# Patient Record
Sex: FEMALE | Race: WHITE
[De-identification: names, ages, dates, MRNs, and addresses within clinical notes are randomized per-mention and may not be internally consistent; named-entity substitution may affect disease eponyms.]

---

## 2017-12-06 ENCOUNTER — HOSPITAL ENCOUNTER (EMERGENCY)
Dept: HOSPITAL 10 - FTE | Age: 27
LOS: 1 days | Discharge: HOME | End: 2017-12-07
Payer: COMMERCIAL

## 2017-12-06 VITALS
HEIGHT: 64 IN | WEIGHT: 208.34 LBS | BODY MASS INDEX: 35.57 KG/M2 | BODY MASS INDEX: 35.57 KG/M2 | HEIGHT: 64 IN | WEIGHT: 208.34 LBS

## 2017-12-06 DIAGNOSIS — J20.9: ICD-10-CM

## 2017-12-06 DIAGNOSIS — Z3A.20: ICD-10-CM

## 2017-12-06 DIAGNOSIS — R06.2: ICD-10-CM

## 2017-12-06 DIAGNOSIS — O99.512: Primary | ICD-10-CM

## 2017-12-06 PROCEDURE — 94664 DEMO&/EVAL PT USE INHALER: CPT

## 2017-12-07 VITALS
RESPIRATION RATE: 17 BRPM | HEART RATE: 95 BPM | DIASTOLIC BLOOD PRESSURE: 71 MMHG | SYSTOLIC BLOOD PRESSURE: 114 MMHG | TEMPERATURE: 98.4 F

## 2017-12-07 NOTE — ERD
ER Documentation


Chief Complaint


Chief Complaint


pt c/o diff breathing x 2 wks, w/ cough, 20 wks preg





HPI


Patient is a 27-year-old pregnant female, approximately 20 weeks pregnant, 

, presents to the ED for concerns of shortness of breath and cough 2 weeks.  

Patient states her cough is dry in nature.  Patient describes her shortness of 

breath to be occurring 2-3 times per day.  Patient states her shortness of 

breath lasts approximately 5-10 minutes.  Patient states that her shortness of 

breath can occur with rest or at movement.  Patient denies any pleuritic chest 

pain.  Patient denies any leg swelling or calf swelling.  Patient denies any 

previous history of a DVT or PE.  Patient denies any recent air travel or 

surgeries.  Patient denies any fevers, nausea, vomiting, abdominal pain, pelvic 

pain, vaginal bleeding, cramping, excessive vaginal discharge or loss of 

fluids.  Patient states his tried taking throat lozenges for symptoms.  Patient 

states approximately 1 month ago she did have a URI with throat pain as well.  

Patient sees Dr. Oquendo is her OB/GYN.





ROS


All systems reviewed and are negative except as per history of present illness.





Medications


Home Meds


Active Scripts


Guaifenesin* (Robitussin*) 100 Mg/5 Ml Syrup, 100 MG PO Q4H Y for COUGH, #1 BOT


   Prov:MIRELLA MICHELLE PA-C         17


Azithromycin* (Zithromax*) 250 Mg Tablet, 250 MG PO .ZPACK AS DIRECTED, #6 TAB


   TAKE 500 MG (2 TABS) THE FIRST DAY THEN 250 MG (1 TAB) DAYS 2-5


   Prov:MIRELLA MICHELLE PA-C         17


Reported Medications


Prenatal Vit-Iron Fumarate-FA (Prenatal Vitamin Tablet) 1 Each Tablet, 1 TAB PO 

DAILY, TAB


   10/17/16





Allergies


Allergies:  


Coded Allergies:  


     No Known Allergy (Unverified , 10/17/16)





PMhx/Soc


Hx Alcohol Use:  No


Hx Substance Use:  No


Hx Tobacco Use:  No





Physical Exam


Vitals





Vital Signs








  Date Time  Temp Pulse Resp B/P Pulse Ox O2 Delivery O2 Flow Rate FiO2


 


17 01:43 98.4 95 17 114/71 98 Room Air  


 


17 00:58  107 18  96   21


 


17 23:31 97.8 108 20 135/70 96   








Physical Exam


GENERAL: Well-developed, well-nourished female. Appears in no acute distress. 

Speaking in full sentences.


HEAD: Normocephalic, atraumatic. No deformities or ecchymosis. 


EYE: Pupils equal, round, and reactive to light. EOMs intact. No conjunctival 

erythema. No eye discharge. 


ENT: External ear without any masses or tenderness. Auditory canals clear 

bilaterally. TM visualized bilaterally, non-erythematous, non-bulging. Nasal 

mucosa pink with no discharge. Oropharynx is pink without any tonsillar 

erythema or exudates. No uvula deviation. No kissing tonsils. 


NECK: Supple. No meningismus. Normal ROM of the neck.


LUNG: Faint wheezing noted in the right upper lobe.


HEART: Mildly tachycardic. No murmurs, rubs or gallops.


ABDOMEN: Soft, nontender, and nondistended. Positive bowel sounds in all four 

quadrants. No rebound tenderness, no guarding. (-) McBurney's point tenderness.

  No CVA tenderness.


BACK: No midline tenderness. 


EXTREMITIES: Equal pulses bilaterally. No peripheral clubbing, cyanosis or 

edema. No unilateral leg swelling.  No pain elicited in bilateral calves with 

dorsiflexion.  No calf tenderness with palpation bilaterally..


NEUROLOGIC: Alert and oriented to person, place and time. Moving all four 

extremities. 5/5 strength in all extremities. Normal speech. Steady gait. 


SKIN: Normal color. Warm and dry. No rashes or lesions.


Results 24 hrs





 Current Medications








 Medications


  (Trade)  Dose


 Ordered  Sig/Anna


 Route


 PRN Reason  Start Time


 Stop Time Status Last Admin


Dose Admin


 


 Ipratropium


 Bromide


  (Atrovent 0.02%


  (Neb))  0.5 mg  ONCE  STAT


 NEB


   17 00:42


 17 00:44 DC 17 00:58


 


 


 Levalbuterol


  (Xopenex Neb)  1.25 mg  ONCE  STAT


 INH


   17 00:42


 17 00:44 DC 17 00:58


 











Procedures/MDM


ED COURSE:


The patient was stable throughout ED course. I kept the patient and/or family 

informed of laboratory and diagnostic imaging results throughout the ED course.

  








MEDICATIONS GIVEN: 


Xopenex, ipratropium





Prior to patient receiving these medications I did explain to the patient that 

Xopenex is a category C medication.  The risks versus benefits of this 

medication were explained to the patient in detail.  Patient understood risks 

versus benefits.  Patient agreed to take medication.  Ipratropium was noted to 

be safe in pregnancy.


Patient tolerated medication well with no adverse reactions. Patient reported 

improvement in pain. 








MEDICAL DECISION MAKING:


This is a 27-year-old pregnant female presents ED for concerns of shortness of 

breath and cough 2 weeks.  Patient denied any pleuritic chest pain, leg 

swelling, tenderness, recent travel or recent surgeries.  Vital signs were 

reviewed. Patient was afebrile. Patient was not hypoxic. ENT exam was normal.  

Lung exam did reveal some wheezing.  I discussed the case with my supervising 

physician, Dr. Gutierrez.  Given the patient's presentation and including her 

findings of wheezing on examination, it was decided that patient would likely 

benefit from a breathing treatment and a course of antibiotics on an outpatient 

basis..  Patient was advised on the risks versus benefits of Xopenex in the 

setting of pregnancy.  Patient continued to wish to receive this medication.  

Upon reexamination, patient had improved breath sounds and felt that her 

shortness of breath was improved.  Patient's O2 sat was improved.  Patient's 

tachycardia was improved.  At this time, patient's presentation is most 

consistent with bronchitis with wheezing.  Given that patient is pregnant 

unable to rule out pneumonia however I will empirically treat the patient with 

a course of Z-[place].  The suspicion for meningitis, sinusitis, otitis externa, 

acute otitis media, strep pharyngitis, epiglottitis, DVT or PE.  Patient was 

given strict return precautions to return to the ED for any new or worsening 

symptoms.  Patient understood and agreed with this plan.  Patient was cleared 

by OB team here in the ED.  OB nurse came down to the ED and confirmed fetal 

heart tones.











PRESCRIPTIONS:


Zithromax and Robitussin





DISCHARGE:


At this time, patient is stable for discharge and outpatient management. 

Supportive therapies such as OTC throat lozenges, salt water gurgles, popsicles 

and jello discussed. I have instructed the patient to follow-up with his/her 

primary care physician in 1-2 days. I have instructed the patient to promptly 

return to the ER for any new or worsening symptoms including increased pain, 

swelling, fever, nausea, vomiting, weakness or difficulty breathing. The 

patient and/or family expressed understanding of and agreement with this plan. 

All questions were answered. Home care instructions were provided. 





Disclaimer: Inadvertent spelling and grammatical errors are likely due to EHR/

dictation software use and do not reflect on the overall quality of patient 

care. Also, please note that the electronic time recorded on this note does not 

necessarily reflect the actual time of the patient encounter.





Departure


Diagnosis:  


 Primary Impression:  


 Bronchitis


 Additional Impression:  


 Wheezing


Condition:  Stable


Patient Instructions:  Bronchitis With Wheezing (Adult)





Additional Instructions:  


Call your primary care doctor TOMORROW for an appointment during the next 1-2 

days.See the doctor sooner or return here if your condition worsens before your 

appointment time.











MIRELLA MICHELLE PA-C Dec 7, 2017 02:03

## 2017-12-19 ENCOUNTER — HOSPITAL ENCOUNTER (EMERGENCY)
Dept: HOSPITAL 10 - FTE | Age: 27
Discharge: HOME | End: 2017-12-19
Payer: COMMERCIAL

## 2017-12-19 VITALS
BODY MASS INDEX: 33.98 KG/M2 | BODY MASS INDEX: 33.98 KG/M2 | HEIGHT: 65 IN | HEIGHT: 65 IN | WEIGHT: 203.93 LBS | WEIGHT: 203.93 LBS

## 2017-12-19 DIAGNOSIS — R06.2: Primary | ICD-10-CM

## 2017-12-19 PROCEDURE — 94664 DEMO&/EVAL PT USE INHALER: CPT

## 2017-12-19 PROCEDURE — 96372 THER/PROPH/DIAG INJ SC/IM: CPT

## 2017-12-19 NOTE — ERD
ER Documentation


Chief Complaint


Chief Complaint


cough , chest congestion , sob





HPI


This is a 27-year-old female presents to the ER with an ongoing cough over the 

last month.  She states that she wheezes at night and she feels short of 

breath.  Shortness of breath occurs at rest and whenever she is walking.  She 

denies any chest pain.  Patient denies any recent travel, leg redness or leg 

swelling.  She has not had a past medical history of asthma.  She is 

approximately 22 weeks pregnant denies any vaginal bleeding, vaginal leakage, 

pelvic pain, urinary frequency or dysuria.  Patient was seen here 3 weeks ago 

and was given azithromycin.  She was also given a losing treatment and she 

stated that it did not help her.  Patient any fevers or chills.  She has not 

traveled anywhere recently.  She has never had a PE or history of PEs or DVTs.  

 A0.  She denies any drinking or smoking.





ROS


12 point review of systems was done, all negative except per HPI.





Medications


Home Meds


Active Scripts


Amoxicillin/Potassium Clav (Amox-Clav 500-125 mg Tablet) 500-125 mg Tab, 1 TAB 

PO BID for 7 Days, TAB


   Prov:MANNY ENRIQUEZ         17


Albuterol Sulfate* (Proair HFA*) 8.5 Gm Hfa.aer.ad, 2 PUFF INH Q4, #1 INHALER


   Prov:MANNY ENRIQUEZ         17


Guaifenesin* (Robitussin*) 100 Mg/5 Ml Syrup, 100 MG PO Q4H Y for COUGH, #1 BOT


   Prov:MIRELLA MICHELLE PA-C         17


Azithromycin* (Zithromax*) 250 Mg Tablet, 250 MG PO .ZPACK AS DIRECTED, #6 TAB


   TAKE 500 MG (2 TABS) THE FIRST DAY THEN 250 MG (1 TAB) DAYS 2-5


   Prov:MIRELLA MICHELLE PA-C         17


Reported Medications


Prenatal Vit-Iron Fumarate-FA (Prenatal Vitamin Tablet) 1 Each Tablet, 1 TAB PO 

DAILY, TAB


   10/17/16





Allergies


Allergies:  


Coded Allergies:  


     No Known Allergy (Unverified , 17)





PMhx/Soc


Medical and Surgical Hx:  pt denies Medical Hx, pt denies Surgical Hx


Hx Alcohol Use:  No


Hx Substance Use:  No


Hx Tobacco Use:  No


Smoking Status:  Never smoker





Physical Exam


Vitals





Vital Signs








  Date Time  Temp Pulse Resp B/P Pulse Ox O2 Delivery O2 Flow Rate FiO2


 


17 12:37  80 18  96   21


 


17 11:19 98.1 90 20 136/76 95   








Physical Exam


GENERAL: The patient is well-developed, well-nourished, in no acute distress.


NECK: Cervical spine is non tender with no step off. Supple, no nuchal rigidity


HEENT: Atraumatic. Pupils equal, round and reactive to light. Extraocular 

muscles are grossly intact. Conjunctivae pink, no discharge. Bilateral tympanic 

membranes are clear with no evidence of erythema, effusion or dulling of the 

light reflex. Tonsilar erythema with no exudates or uvular deviation. Clear 

rhinorrhea. 


RESPIRATORY: Auditory wheezes in the lung fields, no rales rhonchi or crackles.


HEART: Regular rate and rhythm. No murmurs, clicks, rubs or gallops.


EXTREMITIES: No clubbing or cyanosis. Full range of motion. Grossly 

neurovascularly intact. no leg redness or swelling


NEUROLOGIC: Alert and oriented. Cranial nerves II through XII are intact.


SKIN: There is no rash. The skin is warm and dry.


Results 24 hrs





 Current Medications








 Medications


  (Trade)  Dose


 Ordered  Sig/Anna


 Route


 PRN Reason  Start Time


 Stop Time Status Last Admin


Dose Admin


 


 Albuterol


  (Proventil


 0.083% (Neb))  10 mg  ONCE  STAT


 N


   17 12:05


 17 12:11 DC 17 12:37


 


 


 Ipratropium


 Bromide


  (Atrovent 0.02%


  (Neb))  0.5 mg  ONCE  ONCE


 N


   17 12:30


 17 12:31 DC 17 12:36


 


 


 Dexamethasone


  (Decadron)  10 mg  ONCE  ONCE


 IM


   17 12:30


 17 12:31 DC 17 12:29


 











Procedures/MDM


Patient was given a nebulizing treatment in the ER with albuterol and 

ipratropium.  She was also given Decadron in the ER without any complications.  

Upon reexamination patient stated she felt much better, and wheezing was 

improved.  I did discuss that albuterol and Decadron a category C medication 

that they do involve the risk, patient understood the risks and the benefits of 

this medication decided to try medication.





I discussed his case with my supervising physician Dr. Alvarenga, as this is patient

's 2nd time back in the ER for same complaints. I discussed the possibility of 

patient having a pulmonary embolism with Dr. Alvarenga, however suspicion is low as 

patient does present with URI symptoms which are improved with  nebulizing 

treatments. Patient was already treated with azithromycin, she will be sent 

home with Augmentin and she will be given an inhaler for wheezing. Patient did 

not have any leg swelling or redness on physical exam I doubt DVT.  Patient 

urgently needs to follow-up with her OB/GYN as soon as possible, return to ER 

sooner if symptoms worsen.  Medical decision making sure with the patient she 

understands and agrees with plan.





Departure


Diagnosis:  


 Primary Impression:  


 Wheezing


Condition:  Stable


Patient Instructions:  Bronchitis With Wheezing (Adult)





Additional Instructions:  


Call your primary care doctor TOMORROW for an appointment during the next 1-2 

days.See the doctor sooner or return here if your condition worsens before your 

appointment time.











MANNY ENRIQUEZ Dec 19, 2017 14:29

## 2018-04-16 ENCOUNTER — HOSPITAL ENCOUNTER (INPATIENT)
Age: 28
LOS: 2 days | Discharge: HOME | End: 2018-04-18

## 2018-04-16 ENCOUNTER — HOSPITAL ENCOUNTER (INPATIENT)
Dept: HOSPITAL 91 - OBT | Age: 28
LOS: 2 days | Discharge: HOME | End: 2018-04-18
Payer: COMMERCIAL

## 2018-04-16 DIAGNOSIS — Z3A.39: ICD-10-CM

## 2018-04-16 LAB
ADD MAN DIFF?: NO
BASOPHIL #: 0 10^3/UL (ref 0–0.1)
BASOPHILS %: 0.2 % (ref 0–2)
EOSINOPHILS #: 0.1 10^3/UL (ref 0–0.5)
EOSINOPHILS %: 1.5 % (ref 0–7)
HEMATOCRIT: 36.6 % (ref 37–47)
HEMOGLOBIN: 12.8 G/DL (ref 12–16)
HEPATITIS B SURFACE ANTIGEN: NEGATIVE
INR: 0.87
LYMPHOCYTES #: 2.3 10^3/UL (ref 0.8–2.9)
LYMPHOCYTES %: 27.8 % (ref 15–51)
MEAN CORPUSCULAR HEMOGLOBIN: 30.4 PG (ref 29–33)
MEAN CORPUSCULAR HGB CONC: 35 G/DL (ref 32–37)
MEAN CORPUSCULAR VOLUME: 86.9 FL (ref 82–101)
MEAN PLATELET VOLUME: 10.9 FL (ref 7.4–10.4)
MONOCYTE #: 0.5 10^3/UL (ref 0.3–0.9)
MONOCYTES %: 6 % (ref 0–11)
NEUTROPHIL #: 5.2 10^3/UL (ref 1.6–7.5)
NEUTROPHILS %: 64.1 % (ref 39–77)
NUCLEATED RED BLOOD CELLS #: 0 10^3/UL (ref 0–0)
NUCLEATED RED BLOOD CELLS%: 0 /100WBC (ref 0–0)
PARTIAL THROMBOPLASTIN TIME: 29.9 SEC (ref 25–35)
PLATELET COUNT: 167 10^3/UL (ref 140–415)
PROTIME: 11.9 SEC (ref 11.9–14.9)
PT RATIO: 0.9
RAPID PLASMA REAGIN: NONREACTIVE
RED BLOOD COUNT: 4.21 10^6/UL (ref 4.2–5.4)
RED CELL DISTRIBUTION WIDTH: 13.4 % (ref 11.5–14.5)
RUPTURE FETAL MEMBRANES: POSITIVE
WHITE BLOOD COUNT: 8.1 10^3/UL (ref 4.8–10.8)

## 2018-04-16 PROCEDURE — 76818 FETAL BIOPHYS PROFILE W/NST: CPT

## 2018-04-16 PROCEDURE — 85730 THROMBOPLASTIN TIME PARTIAL: CPT

## 2018-04-16 PROCEDURE — 3E033VJ INTRODUCTION OF OTHER HORMONE INTO PERIPHERAL VEIN, PERCUTANEOUS APPROACH: ICD-10-PCS

## 2018-04-16 PROCEDURE — 85610 PROTHROMBIN TIME: CPT

## 2018-04-16 PROCEDURE — 84112 EVAL AMNIOTIC FLUID PROTEIN: CPT

## 2018-04-16 PROCEDURE — 85025 COMPLETE CBC W/AUTO DIFF WBC: CPT

## 2018-04-16 PROCEDURE — 86900 BLOOD TYPING SEROLOGIC ABO: CPT

## 2018-04-16 PROCEDURE — 87340 HEPATITIS B SURFACE AG IA: CPT

## 2018-04-16 PROCEDURE — 0UQMXZZ REPAIR VULVA, EXTERNAL APPROACH: ICD-10-PCS

## 2018-04-16 PROCEDURE — 76815 OB US LIMITED FETUS(S): CPT

## 2018-04-16 PROCEDURE — 86592 SYPHILIS TEST NON-TREP QUAL: CPT

## 2018-04-16 PROCEDURE — 62319: CPT

## 2018-04-16 PROCEDURE — 86901 BLOOD TYPING SEROLOGIC RH(D): CPT

## 2018-04-16 RX ADMIN — IBUPROFEN 1 MG: 600 TABLET ORAL at 18:19

## 2018-04-16 RX ADMIN — PYRIDOXINE HYDROCHLORIDE 1 MLS/HR: 100 INJECTION, SOLUTION INTRAMUSCULAR; INTRAVENOUS at 07:47

## 2018-04-16 RX ADMIN — PYRIDOXINE HYDROCHLORIDE 1 MLS/HR: 100 INJECTION, SOLUTION INTRAMUSCULAR; INTRAVENOUS at 09:51

## 2018-04-16 RX ADMIN — OXYTOCIN 1 MLS/HR: 10 INJECTION, SOLUTION INTRAMUSCULAR; INTRAVENOUS at 16:33

## 2018-04-16 RX ADMIN — Medication 1 MLS/HR: at 10:05

## 2018-04-16 RX ADMIN — Medication 1 MLS/HR: at 20:00

## 2018-04-16 RX ADMIN — Medication 1 MLS/HR: at 16:35

## 2018-04-16 RX ADMIN — Medication 1 SPRAY: at 18:19

## 2018-04-16 RX ADMIN — IBUPROFEN 1 MG: 600 TABLET ORAL at 23:21

## 2018-04-16 RX ADMIN — Medication 1 APPLIC: at 18:20

## 2018-04-16 RX ADMIN — Medication 1 MLS/HR: at 14:24

## 2018-04-16 RX ADMIN — DOCUSATE SODIUM AND SENNOSIDES 1 TAB: 8.6; 5 TABLET, FILM COATED ORAL at 21:52

## 2018-04-16 RX ADMIN — WITCH HAZEL 1 PAD: 500 SOLUTION RECTAL; TOPICAL at 18:19

## 2018-04-17 LAB
ADD MAN DIFF?: NO
BASOPHIL #: 0 10^3/UL (ref 0–0.1)
BASOPHILS %: 0.1 % (ref 0–2)
EOSINOPHILS #: 0.2 10^3/UL (ref 0–0.5)
EOSINOPHILS %: 1.5 % (ref 0–7)
HEMATOCRIT: 26.2 % (ref 37–47)
HEMOGLOBIN: 9.1 G/DL (ref 12–16)
LYMPHOCYTES #: 2.7 10^3/UL (ref 0.8–2.9)
LYMPHOCYTES %: 27.8 % (ref 15–51)
MEAN CORPUSCULAR HEMOGLOBIN: 30.3 PG (ref 29–33)
MEAN CORPUSCULAR HGB CONC: 34.7 G/DL (ref 32–37)
MEAN CORPUSCULAR VOLUME: 87.3 FL (ref 82–101)
MEAN PLATELET VOLUME: 10.6 FL (ref 7.4–10.4)
MONOCYTE #: 0.4 10^3/UL (ref 0.3–0.9)
MONOCYTES %: 4.5 % (ref 0–11)
NEUTROPHIL #: 6.4 10^3/UL (ref 1.6–7.5)
NEUTROPHILS %: 65.3 % (ref 39–77)
NUCLEATED RED BLOOD CELLS #: 0 10^3/UL (ref 0–0)
NUCLEATED RED BLOOD CELLS%: 0 /100WBC (ref 0–0)
PLATELET COUNT: 135 10^3/UL (ref 140–415)
RED BLOOD COUNT: 3 10^6/UL (ref 4.2–5.4)
RED CELL DISTRIBUTION WIDTH: 13.8 % (ref 11.5–14.5)
WHITE BLOOD COUNT: 9.8 10^3/UL (ref 4.8–10.8)

## 2018-04-17 RX ADMIN — Medication 1 MLS/HR: at 00:00

## 2018-04-17 RX ADMIN — DOCUSATE SODIUM AND SENNOSIDES 1 TAB: 8.6; 5 TABLET, FILM COATED ORAL at 21:39

## 2018-04-17 RX ADMIN — IBUPROFEN 1 MG: 600 TABLET ORAL at 17:45

## 2018-04-17 RX ADMIN — IBUPROFEN 1 MG: 600 TABLET ORAL at 05:14

## 2018-04-17 RX ADMIN — Medication 1 MLS/HR: at 08:00

## 2018-04-17 RX ADMIN — IBUPROFEN 1 MG: 600 TABLET ORAL at 11:46

## 2018-04-17 RX ADMIN — Medication 1 MLS/HR: at 12:00

## 2018-04-17 RX ADMIN — Medication 1 MLS/HR: at 20:00

## 2018-04-17 RX ADMIN — OXYCODONE HYDROCHLORIDE AND ASPIRIN 1 TAB: 4.8355; 325 TABLET ORAL at 15:52

## 2018-04-17 RX ADMIN — Medication 1 MLS/HR: at 16:00

## 2018-04-17 RX ADMIN — IBUPROFEN 1 MG: 600 TABLET ORAL at 23:59

## 2018-04-17 RX ADMIN — DOCUSATE SODIUM AND SENNOSIDES 1 TAB: 8.6; 5 TABLET, FILM COATED ORAL at 11:46

## 2018-04-17 RX ADMIN — Medication 1 MLS/HR: at 04:00

## 2018-04-18 RX ADMIN — Medication 1 MLS/HR: at 00:00

## 2018-04-18 RX ADMIN — Medication 1 MLS/HR: at 04:00

## 2018-04-18 RX ADMIN — IBUPROFEN 1 MG: 600 TABLET ORAL at 05:24

## 2018-04-18 RX ADMIN — DOCUSATE SODIUM AND SENNOSIDES 1 TAB: 8.6; 5 TABLET, FILM COATED ORAL at 08:42

## 2019-08-10 ENCOUNTER — HOSPITAL ENCOUNTER (EMERGENCY)
Dept: HOSPITAL 10 - FTE | Age: 29
Discharge: HOME | End: 2019-08-10
Payer: COMMERCIAL

## 2019-08-10 ENCOUNTER — HOSPITAL ENCOUNTER (EMERGENCY)
Dept: HOSPITAL 91 - FTE | Age: 29
Discharge: HOME | End: 2019-08-10
Payer: COMMERCIAL

## 2019-08-10 VITALS
HEIGHT: 64 IN | WEIGHT: 198.42 LBS | HEIGHT: 64 IN | WEIGHT: 198.42 LBS | BODY MASS INDEX: 33.87 KG/M2 | BODY MASS INDEX: 33.87 KG/M2

## 2019-08-10 DIAGNOSIS — N64.4: Primary | ICD-10-CM

## 2019-08-10 PROCEDURE — 99284 EMERGENCY DEPT VISIT MOD MDM: CPT

## 2019-08-10 PROCEDURE — 76642 ULTRASOUND BREAST LIMITED: CPT

## 2019-08-10 NOTE — ERD
ER Documentation


Chief Complaint


Chief Complaint





wants right breast us, has pain/mass?





HPI


29-year-old female presenting with pain to the right breast.  Patient feels that


there is a mass on the 3:00 region of her right breast and denies any nipple 


discharge or blood.  She denies any swelling or warmth to the affected breast 


and is not breast-feeding.  Patient has not breast-fed for the last year.  


Denies fevers.  Denies other medical pulse.  NKDA.  Surgical history denies.  


Social history denies





ROS


All systems reviewed and are negative except as per history of present illness.





Medications


Home Meds


Active Scripts


Naproxen* (Naprosyn*) 500 Mg Tablet, 500 MG PO BID PRN for PAIN AND/OR 


INFLAMMATION, #30 TAB


   Prov:ROLANDO MIRANDA PA-C         8/10/19


Albuterol Sulfate* (Proair HFA*) 8.5 Gm Hfa.aer.ad, 2 PUFF INH Q4, #1 INHALER


   Prov:MANNY ENRIQUEZ         17


Reported Medications


Prenatal Vit-Iron Fumarate-FA (Prenatal Vitamin Tablet) 1 Each Tablet, 1 TAB PO 


DAILY, TAB


   10/17/16





Allergies


Allergies:  


Coded Allergies:  


     No Known Allergy (Unverified , 17)





PMhx/Soc


Medical and Surgical Hx:  pt denies Medical Hx, pt denies Surgical Hx


Hx Alcohol Use:  No


Hx Substance Use:  No


Hx Tobacco Use:  No


Smoking Status:  Never smoker





FmHx


Family History:  No diabetes, No coronary disease, No other





Physical Exam


Vitals





Vital Signs


  Date      Temp  Pulse  Resp  B/P (MAP)   Pulse Ox  O2          O2 Flow    FiO2


Time                                                 Delivery    Rate


   8/10/19  98.1     78    18      121/71        99


     06:55                           (88)





Physical Exam


GENERAL: The patient is well-appearing, well-nourished, in no acute distress


HEENT: Atraumatic.  Conjunctivae are pink.  Pupils equal, round, and reactive to


light.  There is no scleral icterus.  Tympanic membranes clear bilaterally.  


Oropharynx clear.


CHEST: Clear to auscultation bilaterally.  There are no rales, wheezes or 


rhonchi.


HEART: Regular rate and rhythm.  No murmurs, clicks, rubs or gallops.  


ABDOMEN:Soft, nontender and nondistended.  Good bowel sounds.  No rebound or 


guarding.  No gross peritonitis.  No gross organomegaly or masses.  


BREAST: No erythema or swelling noted to the right breast.  No masses felt.  No 


retraction of the nipple.  No blood or drainage noted from the nipple.





Procedures/MDM





                            DIAGNOSTIC IMAGING REPORT





Patient: JOYCE ROSAOD   : 1990   Age: 29  Sex: F                     


  


       MR #:    [id number]   Acct #:   [id number]    DOS: 08/10/19 0711


Ordering MD: NISHANT MIRANDA PA-C   Location:  [facility]   Room/Bed:            


                               


                                        


PROCEDURE:    Ultrasound chest wall


 


CLINICAL INDICATION:  Pain, evaluate for abscess


 


TECHNIQUE:   Multiple sonographic images of the kidneys were obtained.  The 


images were reviewed on a PACS workstation. 


 


COMPARISON:   None 


 


FINDINGS:


 


No lesions are identified within the area of concern at the 3 o'clock position 


in the right breast. A 1.4 x 0.6 x 1.8 cm right axillary lymph node is 


identified.


 


IMPRESSION:


 


1.  No evidence for abscess.


2.  No lesions identified in the region of concern. Followup imaging and 


mammography at a dedicated breast center is recommended.


3.  Mildly prominent right axillary lymph node, nonspecific and possibly 


reactive.





MDM:  29-year-old female presenting with right breast pain.  I have low 


suspicion for mass.  I do not feel any masses on my exam and ultrasound is 


within normal limits.  I do recommend follow-up for mammogram and evaluation by 


breast specialist.  Patient's breast pain is likely associated with hormonal 


changes.  I have low suspicion for infectious process.  Patient is discharged 


with strict your precautions and supportive medications.  Patient is told 


symptoms change or worsen to return immediately to the ER.  All questions 


answered at discharge





Departure


Diagnosis:  


   Primary Impression:  


   Breast pain


Condition:  Stable


Patient Instructions:  Breast Self-Exam (BSE)


Referrals:  


COMMUNITY CLINICS


YOU HAVE RECEIVED A MEDICAL SCREENING EXAM AND THE RESULTS INDICATE THAT YOU DO 


NOT HAVE A CONDITION THAT REQUIRES URGENT TREATMENT IN THE EMERGENCY DEPARTMENT.





FURTHER EVALUATION AND TREATMENT OF YOUR CONDITION CAN WAIT UNTIL YOU ARE SEEN 


IN YOUR DOCTORS OFFICE WITHIN THE NEXT 1-2 DAYS. IT IS YOUR RESPONSIBILITY TO 


MAKE AN APPOINTMENT FOR FOLOW-UP CARE.





IF YOU HAVE A PRIMARY DOCTOR


--you should call your primary doctor and schedule an appointment





IF YOU DO NOT HAVE A PRIMARY DOCTOR YOU CAN CALL OUR PHYSICIAN REFERRAL HOTLINE 


AT


 [phone] 





IF YOU CAN NOT AFFORD TO SEE A PHYSICIAN YOU CAN CHOSE FROM THE FOLLOWING 


COMMUNITY CLINICS





[facility] [phone] [address] [facility] [phone] [address] [facility]. [facility] [phone] [address] [facility] [phone] [address] [facility] [phone] [address] [facility]. [facility] [phone]


[address]





Additional Instructions:  


FOLLOW UP WITH YOUR PRIMARY CARE PHYSICIAN TOMORROW.Return to this facility if 


you are not improving as expected.











ROLANDO MIRANDA PA-C       Aug 10, 2019 09:27